# Patient Record
Sex: MALE | Race: WHITE | Employment: FULL TIME | URBAN - METROPOLITAN AREA
[De-identification: names, ages, dates, MRNs, and addresses within clinical notes are randomized per-mention and may not be internally consistent; named-entity substitution may affect disease eponyms.]

---

## 2018-11-06 ENCOUNTER — OFFICE VISIT (OUTPATIENT)
Dept: FAMILY MEDICINE | Facility: CLINIC | Age: 28
End: 2018-11-06
Payer: COMMERCIAL

## 2018-11-06 VITALS — SYSTOLIC BLOOD PRESSURE: 120 MMHG | TEMPERATURE: 97.7 F | DIASTOLIC BLOOD PRESSURE: 74 MMHG

## 2018-11-06 DIAGNOSIS — Z23 NEED FOR VACCINATION: ICD-10-CM

## 2018-11-06 DIAGNOSIS — Z71.84 COUNSELING ABOUT TRAVEL: Primary | ICD-10-CM

## 2018-11-06 PROCEDURE — 90691 TYPHOID VACCINE IM: CPT | Mod: GA | Performed by: FAMILY MEDICINE

## 2018-11-06 PROCEDURE — 90472 IMMUNIZATION ADMIN EACH ADD: CPT | Mod: GA | Performed by: FAMILY MEDICINE

## 2018-11-06 PROCEDURE — 90632 HEPA VACCINE ADULT IM: CPT | Mod: GA | Performed by: FAMILY MEDICINE

## 2018-11-06 PROCEDURE — 90471 IMMUNIZATION ADMIN: CPT | Mod: GA | Performed by: FAMILY MEDICINE

## 2018-11-06 PROCEDURE — 99402 PREV MED CNSL INDIV APPRX 30: CPT | Mod: 25 | Performed by: FAMILY MEDICINE

## 2018-11-06 RX ORDER — AZITHROMYCIN 500 MG/1
TABLET, FILM COATED ORAL
Qty: 3 TABLET | Refills: 0 | Status: SHIPPED | OUTPATIENT
Start: 2018-11-06 | End: 2024-06-24

## 2018-11-06 NOTE — MR AVS SNAPSHOT
"              After Visit Summary   2018    Leonardo Lobo    MRN: [id number]           Patient Information     Date Of Birth          1990        Visit Information        Provider Department      2018 3:45 PM Matthew Mitchell MD [facility]        Today's Diagnoses     Counseling about travel    -  1    Need for vaccination           Follow-ups after your visit        Who to contact     If you have questions or need follow up information about today's clinic visit or your schedule please contact [facility] directly at [phone].  Normal or non-critical lab and imaging results will be communicated to you by [employer], letter or phone within 4 business days after the clinic has received the results. If you do not hear from us within 7 days, please contact the clinic through [employer] or phone. If you have a critical or abnormal lab result, we will notify you by phone as soon as possible.  Submit refill requests through [employer] or call your pharmacy and they will forward the refill request to us. Please allow 3 business days for your refill to be completed.          Additional Information About Your Visit        MyChart Information     [employer] lets you send messages to your doctor, view your test results, renew your prescriptions, schedule appointments and more. To sign up, go to [email or web address] . Click on \"Log in\" on the left side of the screen, which will take you to the Welcome page. Then click on \"Sign up Now\" on the right side of the page.     You will be asked to enter the access code listed below, as well as some personal information. Please follow the directions to create your username and password.     Your access code is: [email or web address]  Expires: 2019 10:57 AM     Your access code will  in 90 days. If you need help or a new code, please call your [facility] or [phone].        Care EveryWhere ID     This is your Care EveryWhere ID. This " could be used by other organizations to access your [place] medical records  [id number]        Your Vitals Were     Temperature                   97.7  F (36.5  C) (Oral)            Blood Pressure from Last 3 Encounters:   11/06/18 120/74    Weight from Last 3 Encounters:   No data found for Wt              We Performed the Following     ADMIN 1st VACCINE     EA ADD'L VACCINE     HEPA VACCINE ADULT IM     TYPHOID VACCINE, IM          Today's Medication Changes          These changes are accurate as of 11/6/18 11:59 PM.  If you have any questions, ask your nurse or doctor.               Start taking these medicines.        Dose/Directions    azithromycin 500 MG tablet   Commonly known as:  ZITHROMAX   Used for:  Need for vaccination   Started by:  Matthew Mitchell MD        Take one tablet daily for up to 3 days as needed for traveler's diarrhea   Quantity:  3 tablet   Refills:  0            Where to get your medicines      These medications were sent to [facility] [address] [place] [address]     Phone:  [phone]     azithromycin 500 MG tablet                Primary Care Provider Office Phone # Fax #    [facility] [phone]       [address]  [facility] [address]        Equal Access to Services     JONATHON ESCOBAR AH: Hadii aad ku hadasho Soomaali, waaxda luqadaha, qaybta kaalmada adeegyada, waxay idiin hayaan rey salgado. So [facility] [phone].    ATENCIÓN: Si habla español, tiene a escalante disposición servicios gratuitos de asistencia lingüística. Llame al [phone].    We comply with applicable federal civil rights laws and [place] laws. We do not discriminate on the basis of race, color, national origin, age, disability, sex, sexual orientation, or gender identity.            Thank you!     Thank you for choosing [facility]  for your care. Our goal is always to provide you with  excellent care. Hearing back from our patients is one way we can continue to improve our services. Please take a few minutes to complete the written survey that you may receive in the mail after your visit with us. Thank you!             Your Updated Medication List - Protect others around you: Learn how to safely use, store and throw away your medicines at www.disposemymeds.org.          This list is accurate as of 11/6/18 11:59 PM.  Always use your most recent med list.                   Brand Name Dispense Instructions for use Diagnosis    azithromycin 500 MG tablet    ZITHROMAX    3 tablet    Take one tablet daily for up to 3 days as needed for traveler's diarrhea    Need for vaccination

## 2018-11-06 NOTE — PROGRESS NOTES
Itinerary:  [place]    Departure Date: 11/24/18    Return Date: 12/7/18    Length of Trip: 11 days    Purpose of Trip: business    Urban/Rural: urban    Accommodations: hotel    IMMUNIZATION HISTORY  Have you received any immunizations within the past 4 weeks?  Flu shot unsure when  Have you ever fainted from having your blood drawn or from an injection?  No  Have you ever had a fever reaction to vaccination?  No  Have you ever had any bad reaction or side effect from any vaccination?  No  Have you ever had hepatitis A or B vaccine?  Yes  Do you live (or work closely) with anyone who has AIDS, an AIDS-like condition, any other immune disorder or who is on chemotherapy for cancer or a   family history of immunodeficiency?  No  Have you received any injection of immune globulin or any blood products during the past 12 months?  No    Patient roomed by Kishore Payne ma      Special medical concerns: none    /74 (BP Location: Right arm)  Temp 97.7  F (36.5  C) (Oral)  EXAM: deferred    Immunizations discussed include: Hepatitis A, Typhoid and Japanese Encephalitis  JE deferred, low risk  Malaraia prophylaxis recommended: none  Symptomatic treatment for traveler's diarrhea: azithromycin    ASSESSMENT/PLAN:    ICD-10-CM    1. Counseling about travel Z71.89    2. Need for vaccination Z23 HEPA VACCINE ADULT IM     TYPHOID VACCINE, IM     azithromycin (ZITHROMAX) 500 MG tablet     I have reviewed general recommendations for safe travel   including: food/water precautions, insect avoidance, safe sex   practices given high prevalence of HIV and other STDs,   roadway safety. Educational materials and [employer] report provided.    Total visit time 30 minutes with over 50% of time spent counseling patient.

## 2018-11-06 NOTE — NURSING NOTE
Chief Complaint   Patient presents with     Travel Clinic     initial /74 (BP Location: Right arm)  Temp 97.7  F (36.5  C) (Oral) There is no height or weight on file to calculate BMI.  BP completed using cuff size: regular.   R arm      Health Maintenance that is potentially due pending provider review:  NONE    n/a    Kishore Payne ma

## 2024-06-18 ENCOUNTER — TELEPHONE (OUTPATIENT)
Dept: OTOLARYNGOLOGY | Facility: CLINIC | Age: 34
End: 2024-06-18
Payer: COMMERCIAL

## 2024-06-18 NOTE — TELEPHONE ENCOUNTER
[facility] Call Center    Phone Message    May a detailed message be left on voicemail: yes     Reason for Call: Other: The pt fractured his nose Saturday 6.15.24 and was seen in the [facility] ED. The pt would like to be seen at either [place] or [place]. Please contact the pt to discuss. Thanks.( I asked the pt to call [facility] and send the medical, but he said it would be in My Chart.)     Action Taken: Message routed to:  [facility] ENT    Travel Screening: Not Applicable     Date of Service:

## 2024-06-18 NOTE — TELEPHONE ENCOUNTER
Patient scheduled for 6/24/04 in [place].    Sanjuana Wooten RN Care Coordinator, ENT Specialty Clinic 06/18/24 12:27 PM

## 2024-06-24 ENCOUNTER — OFFICE VISIT (OUTPATIENT)
Dept: OTOLARYNGOLOGY | Facility: CLINIC | Age: 34
End: 2024-06-24
Payer: COMMERCIAL

## 2024-06-24 VITALS
SYSTOLIC BLOOD PRESSURE: 114 MMHG | RESPIRATION RATE: 167 BRPM | HEART RATE: 77 BPM | OXYGEN SATURATION: 97 % | DIASTOLIC BLOOD PRESSURE: 72 MMHG | WEIGHT: 175 LBS

## 2024-06-24 DIAGNOSIS — S02.2XXA CLOSED FRACTURE OF NASAL BONE, INITIAL ENCOUNTER: Primary | ICD-10-CM

## 2024-06-24 PROCEDURE — 99203 OFFICE O/P NEW LOW 30 MIN: CPT | Performed by: OTOLARYNGOLOGY

## 2024-06-24 ASSESSMENT — PAIN SCALES - GENERAL: PAINLEVEL: NO PAIN (0)

## 2024-06-24 NOTE — LETTER
6/24/2024      Leonardo Lobo  [address]  [place] [address]      Dear Colleague,    Thank you for referring your patient, Leonardo Lobo, to the [facility]. Please see a copy of my visit note below.    History of Present Illness - Leonardo Lobo is a very pleasant 33 year old male here to see me for the first time following a nasal injury.    On review of the Records from [facility], he presented on 6/15/2024, the day of the injury.  He was wake boarding and the board slipped out and struck him in the face.  There was immediate epistaxis and pain that had stopped by the time of presentation.  There was no LOC, no dental injury, no changes in vision.    CT scan of the facial bones was done at that day and read as follows:  Acute comminuted mildly displaced and nondisplaced nasal bone fractures.  Bony   nasal septum acute nondisplaced fracture.  No discrete nasal septal hematoma,   allowing for some blood products within the nasal cavity.  Overlying soft   tissue edema/contusion and emphysema.  The pterygoid plates and zygomatic   arches are intact.  The temporomandibular joints are normal in alignment.   Paranasal sinuses demonstrate minimal mucosal thickening, without air-fluid   level.  Visualized mastoid air cells are clear.  Intraorbital fat planes are   preserved.     He thinks that the nose overall looks overall midline and looks good, and there is no issue with breathing.  No changes in vision, the teeth feel normal, the hearing is unchanged. No previous ENT surgery other than tonsils as a child, and a septoplasty as a teenager.    Past Medical History - There is no problem list on file for this patient.      Current Medications -   Current Outpatient Medications:      azithromycin (ZITHROMAX) 500 MG tablet, Take one tablet daily for up to 3 days as needed for traveler's diarrhea, Disp: 3 tablet, Rfl: 0    Allergies - No Known Allergies    Social History -   Social History      Socioeconomic History     Marital status:    Tobacco Use     Smoking status: Never     Smokeless tobacco: Never     Social Determinants of Health     Financial Resource Strain: Low Risk  (2/7/2023)    [facility]    Financial Resource Strain      Difficulty of Paying Living Expenses: 3   Food Insecurity: No Food Insecurity (2/7/2023)    [facility]    Food Insecurity      Worried About Running Out of Food in the Last Year: 1   Transportation Needs: No Transportation Needs (2/7/2023)    [facility]    Transportation Needs      Lack of Transportation (Medical): 1    [facility]    Social Connections   Interpersonal Safety: Not At Risk (6/15/2024)    Received from [facility]    Intimate Partner Violence      Are you in a relationship where you are physically hurt, threatened and/or made to feel afraid?: No   Housing Stability: Low Risk  (2/7/2023)    [facility]    Housing Stability      Unable to Pay for Housing in the Last Year: 1       Family History - No family history on file.    Review of Systems - As per HPI and PMHx, otherwise 10+ system review of the head and neck, and general constitution is negative.    Physical Exam  /72   Pulse 77   Resp (!) 167   Wt 79.4 kg (175 lb)   SpO2 97%       General - The patient is well nourished and well developed, and appears to have good nutritional status.  Alert and oriented to person and place, answers questions and cooperates with examination appropriately.   Head and Face - Normocephalic and atraumatic, with no gross asymmetry noted of the contour of the facial features.  The facial nerve is intact, with strong symmetric movements.  Voice and Breathing - The patient was breathing comfortably without  the use of accessory muscles. There was no wheezing, stridor, or stertor.  The patients voice was clear and strong, and had appropriate pitch and quality.  Ears - The tympanic membranes are normal in appearance, bony landmarks are intact.  No retraction, perforation, or masses.  No fluid or purulence was seen in the external canal or the middle ear. No evidence of infection of the middle ear or external canal, cerumen was normal in appearance.  Eyes - Extraocular movements intact, and the pupils were reactive to light.  Sclera were not icteric or injected, conjunctiva were pink and moist.  Mouth - Examination of the oral cavity showed pink, healthy oral mucosa. No lesions or ulcerations noted.  The tongue was mobile and midline, and the dentition were in good condition.    Throat - The walls of the oropharynx were smooth, pink, moist, symmetric, and had no lesions or ulcerations.  The tonsillar pillars and soft palate were symmetric.  The uvula was midline on elevation.    Neck - Normal midline excursion of the laryngotracheal complex during swallowing.  Full range of motion on passive movement.  Palpation of the occipital, submental, submandibular, internal jugular chain, and supraclavicular nodes did not demonstrate any abnormal lymph nodes or masses.  The carotid pulse was palpable bilaterally.  Palpation of the thyroid was soft and smooth, with no nodules or goiter appreciated.  The trachea was mobile and midline.    Nose - External contour is symmetric, no gross deflection or scars, some mild residual edema of the RIGHT nasal sidewall.  Nasal mucosa is pink and moist with no abnormal mucus.  The septum was midline and non-obstructive, turbinates of normal size and position.  No polyps, masses, or purulence noted on examination.  Facial Skeleton - To evaluate the facial skeleton for trauma, I palpated the zygomatic arches and malar eminences, and there was no pain or step offs.  The forehead did not have any  depressions or ecchymosis.  The orbital rims were symmetric, non-tender, and there were no step offs.  The hard palate did not have a drawer sign, and the mandible did not have any pain or instability on AP or lateral manipulation.  There were no mucosal disruptions, alveolar ridge step offs, or loose teeth noted on exam.     A/P - Leonardo Lobo is a 33 year old male  (S02.2XXA) Closed fracture of nasal bone, initial encounter  (primary encounter diagnosis)    Given that the nose does not have a cosmetic alteration when compared to photos he showed me on his phone, and the fact that he denies any change in nasal airway, I do not recommend surgery at this point.    Follow-up as needed if there is any long term alteration of shape or nasal airway.    I have counseled him to avoid any bumping or twisting of the nose for another one month      Again, thank you for allowing me to participate in the care of your patient.        Sincerely,        Aron Savage MD

## 2024-06-24 NOTE — PROGRESS NOTES
History of Present Illness - Leonardo Lboo is a very pleasant 33 year old male here to see me for the first time following a nasal injury.    On review of the Records from [facility], he presented on 6/15/2024, the day of the injury.  He was wake boarding and the board slipped out and struck him in the face.  There was immediate epistaxis and pain that had stopped by the time of presentation.  There was no LOC, no dental injury, no changes in vision.    CT scan of the facial bones was done at that day and read as follows:  Acute comminuted mildly displaced and nondisplaced nasal bone fractures.  Bony   nasal septum acute nondisplaced fracture.  No discrete nasal septal hematoma,   allowing for some blood products within the nasal cavity.  Overlying soft   tissue edema/contusion and emphysema.  The pterygoid plates and zygomatic   arches are intact.  The temporomandibular joints are normal in alignment.   Paranasal sinuses demonstrate minimal mucosal thickening, without air-fluid   level.  Visualized mastoid air cells are clear.  Intraorbital fat planes are   preserved.     He thinks that the nose overall looks overall midline and looks good, and there is no issue with breathing.  No changes in vision, the teeth feel normal, the hearing is unchanged. No previous ENT surgery other than tonsils as a child, and a septoplasty as a teenager.    Past Medical History - There is no problem list on file for this patient.      Current Medications -   Current Outpatient Medications:     azithromycin (ZITHROMAX) 500 MG tablet, Take one tablet daily for up to 3 days as needed for traveler's diarrhea, Disp: 3 tablet, Rfl: 0    Allergies - No Known Allergies    Social History -   Social History     Socioeconomic History    Marital status:    Tobacco Use    Smoking status: Never    Smokeless tobacco: Never     Social Determinants of Health     Financial Resource Strain: Low Risk  (2/7/2023)    [facility]    Financial Resource Strain     Difficulty of Paying Living Expenses: 3   Food Insecurity: No Food Insecurity (2/7/2023)    Received from [facility]    Food Insecurity     Worried About Running Out of Food in the Last Year: 1   Transportation Needs: No Transportation Needs (2/7/2023)    Received from [facility]    Transportation Needs     Lack of Transportation (Medical): 1    Received from [facility]    Social Connections   Interpersonal Safety: Not At Risk (6/15/2024)    Received from [facility]    Intimate Partner Violence     Are you in a relationship where you are physically hurt, threatened and/or made to feel afraid?: No   Housing Stability: Low Risk  (2/7/2023)    Received from [facility]    Housing Stability     Unable to Pay for Housing in the Last Year: 1       Family History - No family history on file.    Review of Systems - As per HPI and PMHx, otherwise 10+ system review of the head and neck, and general constitution is negative.    Physical Exam  /72   Pulse 77   Resp (!) 167   Wt 79.4 kg (175 lb)   SpO2 97%       General - The patient is well nourished and well developed, and appears to have good nutritional status.  Alert and oriented to person and place, answers questions and cooperates with examination appropriately.   Head and Face - Normocephalic and atraumatic, with no gross asymmetry noted of the contour of the facial features.  The facial nerve is intact, with strong symmetric movements.  Voice and Breathing - The patient was breathing comfortably without the use of accessory muscles. There was no wheezing, stridor, or stertor.  The patients voice was clear and strong, and had appropriate pitch and quality.  Ears - The tympanic membranes are normal in appearance, bony landmarks are intact.  No  retraction, perforation, or masses.  No fluid or purulence was seen in the external canal or the middle ear. No evidence of infection of the middle ear or external canal, cerumen was normal in appearance.  Eyes - Extraocular movements intact, and the pupils were reactive to light.  Sclera were not icteric or injected, conjunctiva were pink and moist.  Mouth - Examination of the oral cavity showed pink, healthy oral mucosa. No lesions or ulcerations noted.  The tongue was mobile and midline, and the dentition were in good condition.    Throat - The walls of the oropharynx were smooth, pink, moist, symmetric, and had no lesions or ulcerations.  The tonsillar pillars and soft palate were symmetric.  The uvula was midline on elevation.    Neck - Normal midline excursion of the laryngotracheal complex during swallowing.  Full range of motion on passive movement.  Palpation of the occipital, submental, submandibular, internal jugular chain, and supraclavicular nodes did not demonstrate any abnormal lymph nodes or masses.  The carotid pulse was palpable bilaterally.  Palpation of the thyroid was soft and smooth, with no nodules or goiter appreciated.  The trachea was mobile and midline.    Nose - External contour is symmetric, no gross deflection or scars, some mild residual edema of the RIGHT nasal sidewall.  Nasal mucosa is pink and moist with no abnormal mucus.  The septum was midline and non-obstructive, turbinates of normal size and position.  No polyps, masses, or purulence noted on examination.  Facial Skeleton - To evaluate the facial skeleton for trauma, I palpated the zygomatic arches and malar eminences, and there was no pain or step offs.  The forehead did not have any depressions or ecchymosis.  The orbital rims were symmetric, non-tender, and there were no step offs.  The hard palate did not have a drawer sign, and the mandible did not have any pain or instability on AP or lateral manipulation.  There were no  mucosal disruptions, alveolar ridge step offs, or loose teeth noted on exam.     A/P - Leonardo LOPES Yamil is a 33 year old male  (S02.2XXA) Closed fracture of nasal bone, initial encounter  (primary encounter diagnosis)    Given that the nose does not have a cosmetic alteration when compared to photos he showed me on his phone, and the fact that he denies any change in nasal airway, I do not recommend surgery at this point.    Follow-up as needed if there is any long term alteration of shape or nasal airway.    I have counseled him to avoid any bumping or twisting of the nose for another one month

## 2024-07-14 ENCOUNTER — HEALTH MAINTENANCE LETTER (OUTPATIENT)
Age: 34
End: 2024-07-14

## 2025-07-19 ENCOUNTER — HEALTH MAINTENANCE LETTER (OUTPATIENT)
Age: 35
End: 2025-07-19